# Patient Record
Sex: MALE | URBAN - METROPOLITAN AREA
[De-identification: names, ages, dates, MRNs, and addresses within clinical notes are randomized per-mention and may not be internally consistent; named-entity substitution may affect disease eponyms.]

---

## 2023-11-16 ENCOUNTER — APPOINTMENT (RX ONLY)
Dept: URBAN - METROPOLITAN AREA CLINIC 116 | Facility: CLINIC | Age: 48
Setting detail: DERMATOLOGY
End: 2023-11-16

## 2023-11-16 DIAGNOSIS — Z71.89 OTHER SPECIFIED COUNSELING: ICD-10-CM

## 2023-11-16 PROBLEM — D48.5 NEOPLASM OF UNCERTAIN BEHAVIOR OF SKIN: Status: ACTIVE | Noted: 2023-11-16

## 2023-11-16 PROCEDURE — ? CURETTAGE AND DESTRUCTION WITH PATHOLOGY

## 2023-11-16 PROCEDURE — 99202 OFFICE O/P NEW SF 15 MIN: CPT | Mod: 25

## 2023-11-16 PROCEDURE — ? COUNSELING

## 2023-11-16 PROCEDURE — 17262 DSTRJ MAL LES T/A/L 1.1-2.0: CPT

## 2023-11-16 NOTE — HPI: SKIN LESION
What Type Of Note Output Would You Prefer (Optional)?: Standard Output
Is This A New Presentation, Or A Follow-Up?: Skin Lesion Referral
Who Is Your Referring Provider?: Dr. Vale

## 2024-10-07 NOTE — PROCEDURE: CURETTAGE AND DESTRUCTION WITH PATHOLOGY
10/9/2024      Rukhsana A Gallito  [address]  [facility] [address]    Dear Ms. Conti,    Your procedure is scheduled for March 6, 2025 with Dr. Anthony Hill at:    [facility]  [address] [place]   [address]  [phone]  Please enter the main entrance and take the elevators to the 3rd floor.  Check in at Same Day Surgery desk.    You can expect to be contact by Guerrero 2 days prior to surgery to confirm arrival and surgery time.    The following appointment(s) have been scheduled for you:    Post-op with  BERNARD Walker for Dr. Hill at the [facility], Medical Office Building [address], [address] ([address] [place] [id number]) on March 25, 2025 at 10:20 am.    To better prepare for your surgery, please follow these instructions:    Please schedule an appointment with your Primary Care Physician for a PreOperative History and Physical for 2-3 weeks before your surgery date.   This is a pre-operative requirement for medical clearance for surgery/anesthesia. Your primary care physician will perform a history and physical and order lab work to review, ensuring there is not any other medical concern that would prevent safely proceeding with surgery.  We will send them the information about your planned procedure and what testing we are looking for (and where to send it to).   If you haven't already done so, please call Enid Cardona MD as soon as possible to schedule an appointment. Please call Guerrero, surgery scheduler at [phone], when your appointment has been scheduled with your primary care physician. Go ahead and leave a voicemail with this information (date, time, and name of your doctor).    Dr. Hill also asks that if you see a heart doctor (Cardiologist) regularly, please touch base with their office letting them know of your surgery date and what you are having done. Their office will let you know if you need 
any further testing and/or if you need to see them prior to surgery.      All Weight Loss Medication (both traditional weight loss and diabetic medications used to treat weight loss) must be stopped 7 days before surgery or your surgery will be cancelled.  If you are diabetic, please consult with your prescribing physician for any alternative or additional medication instructions before surgery.    Starting 5 days prior to your surgery, please do not take any aspirin products, anti-inflammatory medication or blood thinners.  This includes products such as Mai-Alexandria, Pepto Bismol, Motrin, Ibuprofen and Advil should also be avoided.  (Standard Tylenol products are aspirin free, so Standard Tylenol products are OK to take for pain.).      If you take any other prescription medication, including blood thinners such as Coumadin, Eliquis, Plavix, or Xarelto, please contact your ordering or primary care physician ASAP, so that you can inform them about your upcoming surgery and so that they can decide with you if any changes to your medication schedule are necessary.  If approved by your physician, you may take blood pressure and heart medications the morning of the procedure with a small amount of water.    Do not have anything to eat or drink starting at midnight the night before your surgery.     Be sure you use your HibiClens!    It is a topical antiseptic, antimicrobial skin cleanser; Hibiclens gently and effectively cleanses skin and superficial wounds and can protect the skin for up to 24 hours. It's gentle on patient skin and as simple and easy to use as any liquid soap. If not provided by your doctor's office, purchase an 8 ounce bottle at your local pharmacy as this is over the counter.    You will receive a call a week before surgery from [employer] regarding setting up a time and date to deliver the CPM machine Dr. Hill would like you to use after surgery. [employer]'s phone number  Is 
[phone].    For your safety, you must have a ride home after surgery, due to both anesthesia and post-op pain medication. You must be with someone who can take responsibility for you and assist with your discharge from the surgery center, not a cab, bus, etc.  You will need someone available to remain with you up to 24 hours after you have been discharged.    Please remember that all times are subject to change as the hospital coordinates the schedule to meet the needs of your surgery and the overall flow of the OR that day.  You will be called ASAP to advise you of any changes to your surgery time or the time you need to arrive for your surgery.    Pre-Procedural COVID Testing is NOT required as long as you remain symptom-free from now through your surgery date.  At any time, if you experience COVID-like symptoms, please contact your primary care physician for evaluation.  If you test positive for COVID between now and your surgery date, please call our office as soon as possible.  We might need to postpone your procedure until it is safe for you to proceed. For more information, please visit our website at [email or web address]       Before your surgery, you will receive an invitation via email from [employer], our partner in Patient Education.    This informative web-based education program will give you helpful information pertaining to your upcoming surgery and recovery.  We encourage you to watch the videos before your surgery. They contain valuable information that will help you know what to expect, what you can do to recover and answer some of the questions you may have. Having that knowledge can help you work towards getting back to your normal routines as soon as possible.    You may also receive an invitation from us via your [employer] account a short 5 min questionnaire regarding the impact your diagnosis has on your current abilities and 
activities.  Our hospital and clinic teams will help you complete this if you don't have a chance to on your own (or do not have a current [employer] account).  Your surgeon is hoping to use this information to alina and monitor your progress starting before your surgery and in the weeks/months during your recovery after your procedure.  Thank you for your participation!    If you have any work related and/or disability forms that need to be completed, please contact the Forms Completion Department at [phone]. Forms can be dropped off at any of our [facility] locations. Please be advised that it can take 7 to 14 business days to complete these requests.    If you have questions regarding the procedure, medications, rehab, etc., please contact the nursing staff at [phone].    If you have any scheduling questions or need to reschedule, please contact me at the telephone number and extension listed below.     Thank you,    Guerrero at [phone]  Surgery Scheduler for Dr. Anthony Hill  [facility]                                            Insurance Authorization Need to Know’s     Prior to your surgical procedure, our team will contact your Insurance Company to initiate a PreAuthorization request.       This is not a guarantee of payment from your insurance company, but rather a step taken to ensure that we have all of the information and documentation for them to confirm the procedure is one that is eligible for coverage under your plan.     We will contact you if we either need more information from you to fulfill the requirements of your insurance company, or if we need to discuss any concerns that may lead to postponement or cancellation of your procedure. If you have any questions regarding your surgery authorization, please check with your insurance company or call our office for an update.     If you need information regarding your level of benefits or out-of-pocket expenses, 
please contact your insurance company directly.  They can also confirm for you whether or not we (the surgeon and the hospital/surgery center) are in your plan’s preferred network (aka ‘in-network’).     What to do if… My Insurance Changes:  If, at any time, your insurance company, plan or even card changes… Please call our office so that our team can be sure to update your records.  We will need to make sure to submit any PreAuth or elisa to the correct, up-to-date insurance plan.       What to do if… My Insurance Requires A Referral:  If your insurance company requires a Referral for Specialty Care or to see a Specialist, you will need to confirm with them if you have one on file.    If your insurance carrier does not have a referral, then you will need to contact your Primary Care Physician to have one directly submitted to your insurance company ASAP.    Without a referral on file, your insurance company will not Pre-Authorize your surgery and may not cover any of your care with our specialty.     What to do if… I have a Workers Compensation (W/C) Claim:  If you have a W/C claim, please be sure to provide our reception team with the information you have regarding your claim ASAP.  We will contact your W/C carrier/adjustor to inform them of your upcoming surgery and check the status of your claim (open vs closed).  We will let you know if they advise of any concerns or issues with your claim.  Even if you have an open W/C claim, please also provide us with your personal/family insurance.  We will want to be sure this plan is loaded into your account.  We always PreAuthorize with personal insurance as a back-up to W/C.  Otherwise, if W/C doesn’t cover something along the way, you will receive a bill for the services.     What to do if… I have Month-to-Month Coverage/Premiums:  If you have an insurance plan that is paid for month to month, or is subject to plan change on a monthly basis, please be aware we 
cannot initiate PreAuthorization until just before the month of your surgery, as your insurance company will need to verify your premium payments/eligibility first.     What to do if… I Do Not Have Insurance Coverage or Have other Insurance/Billing Questions:  Please call our Patient Contact Center:  [phone] to speak with a team member about your billing needs, including possible coverage options, setting up payment plans, our fee schedule, etc.        MEDICATIONS TO STOP / CHANGE BEFORE SURGERY  Please read through this list to make sure you are adjusting your medication appropriately before surgery.  Failure to do so might result in cancellation or rescheduling surgery.    BLOOD THINNERS / ANTICOAGULATION MEDICATIONS    If you take prescription medication that is a blood thinner, please contact the prescribing provider or primary care ASAP to determine when to hold the medication prior to surgery.     Examples include:  Warfarin (Coumadin)     Clopidrogel (Plavix)  Apixaban (Eliquis)  Rivaroxaban (Xarelto)  ASPIRIN and ANTI-INFLAMMATORY (NSAID) PRODUCTS   Do not take FIVE (5) days prior to procedure.    OVER-THE-COUNTER:    Aspirin… including:  Anacin, [place], [place], Aspergum, Aspercin, Aspermin, Aspertab, Back-quell, Duradyne, Empirin, Gemnisyn, Genprin, Gensan, Magnaprin, McNess Pain Tab, Momentum, P-A-C, Pain Reliever Tabs, Tri-Pain Caplets, Vanquish Caplet.  Buffered Aspirin… including:  Ascriptin, Bufferin, Ecotrin, Buffaprin, Buffasal, Buffinol, COPE.  Aspirin Suppositories (generic, any strength)  Excedrin, Excedrin Extra, Excedrin IB  Ibuprofen… including: Advil, Nuprin, Motrin IB, Adapin, Genpril, Ibufen 200, Menadol, Midol IB, Dristan Sinus, Ursinus Inlay Tabs, Dimetapp Sinus, Valparin, Haltran Tabs, Ian's Pills, Jose R.  Naproxen… including: Aleve  Mai Globe or Bromo-Globe  Pepto Bismol     PRESCRIPTION:  Brand Name Generic Name Brand Name Generic Name      Fiorinal   butalbital, aspirin, 
caffeine    Lodine   etodolac      Naprosyn, Anaprox   naproxen    Mobic   meloxicam      Voltaren, Cataflam   diclofenac    Meclomen   meclofenamate      Feldene   piroxicam    Nalfon   fenoprofen      Motrin (Rx), Rufen   ibuprofen    Ponstel   mefenamic acid      Ansaid   flurbiprofen    Relafen   nabumetone      Orudis   ketoprofen    Toradol   ketorolac      Dolobid   diflunisal    Azdone Tabs   aspirin plus hydrocodone      Clinoril   sulindac    Percodan   aspirin plus oxycodone      Indocin   indomethacin    Synalgos   aspirin plus dihydrocodeine      Tolectin   tolmetin    Daypro   oxaprozin                    WEIGHT LOSS MEDICATIONS  If you are taking these medications and have DIABETES please contact your primary care doctor about when to stop these medications and whether you will need an alternative medication.  If on WEEKLY dosing, hold SEVEN (7) days prior to procedure.   If on DAILY dosing, hold on the day of procedure.   Dulaglutide (Trulicity)  Exenatide (Bydureon BCise, Byetta)  Liraglutide (Victoza, Saxenda)  Pramlintide acetate (Symlin)  Semaglutide (Ozempic, Wegovy, Rybelsus)  Tirzepatide (Mounjaro)  Liraglutide + insulin Degludec (Xultophy)  Lixisenatide + insulin Glargine (Soliqua)    Do not take SEVEN (7) days prior to procedure.    Benzphetamine  Diethylpropion  Phendimetrazine  Phentermine (Adipex, Lomaira)  Phentermine/topiramate (Qsymia)...  Note: to prevent seizures from abrupt withdrawal, take a dose every other day  for at least 1 week before stopping treatment.  Herbs and Dietary Supplements  Do not take FIVE (5) days prior to procedure.        Alfalfa    American ginseng    Cheryl    Anise    Arnica montana    Asafetida    Kismet bark    Bilberry    Birch    Black cohosh    Bladderwrack    Bogbean    Boldo    Borage seed oil    Bromelain    Capsicum    Cat's claw    Celery     Chamomile   Wright    Clove    Coleus    Cordyceps       Dandelion     Danshen    Devil's claw    Dong 
quai    EPA (eicosapentaenoic    acid, found in fish oils)    Evening primrose oil    Fenugreek    Feverfew    Fish oil    Flaxseed/flax powder     Garlic    Martha    Ginkgo biloba    Grapefruit juice     Grapeseed     Green tea    Guggul    Gymnestra    Horse chestnut    Horseradish    Licorie root    Lovage root    Male fern    Jonny    Melatonin    Nordihydroguairetic acid (NDGA)    Omega-3 fatty acids    Onion    Papain    Panax ginseng    Parsley      Passionflower    Poplar   Prickly jenna    Propolis    Quassia    Red clover    Reishi    Saw palmetto    Siberian ginseng    Sweet clover    Rue    Sweet birch    Turmeric    Vitamin E    White willow    Wild carrot    Wild lettuce    [place]    [place]    [place]       
Detail Level: Detailed
Size Of Lesion In Cm: 0.6
Size Of Lesion After Curettage: 1.1
Anesthesia Type: 1% lidocaine with epinephrine
Cautery Type: electrodesiccation
Number Of Curettages: 3
What Was Performed First?: Curettage
Additional Information: (Optional): The wound was cleaned, and a pressure dressing was applied.  The patient received detailed post-op instructions.
Lab: [id number]
Lab Facility: [id number]
Histology Text: Following the procedure a portion of the curetted material was sent for histologic evaluation.
Biopsy Type: H and E
Render Path Notes In Note?: No
Consent was obtained from the patient. The risks, benefits and alternatives to therapy were discussed in detail. Specifically, the risks of infection, scarring, bleeding, prolonged wound healing, nerve injury, incomplete removal, allergy to anesthesia and recurrence were addressed. Alternatives to ED&C, such as: surgical removal and XRT were also discussed.  Prior to the procedure, the treatment site was clearly identified and confirmed by the patient. All components of Universal Protocol/PAUSE Rule completed.
Post-Care Instructions: I reviewed with the patient in detail post-care instructions. Patient is to keep the area dry for 48 hours, and not to engage in any swimming until the area is healed. Should the patient develop any fevers, chills, bleeding, severe pain patient will contact the office immediately.
Billing Type: Third-Party Bill
Bill As A Line Item Or As Units: Line Item